# Patient Record
Sex: FEMALE | Race: BLACK OR AFRICAN AMERICAN | NOT HISPANIC OR LATINO | URBAN - METROPOLITAN AREA
[De-identification: names, ages, dates, MRNs, and addresses within clinical notes are randomized per-mention and may not be internally consistent; named-entity substitution may affect disease eponyms.]

---

## 2020-06-26 ENCOUNTER — OFFICE VISIT (OUTPATIENT)
Dept: URBAN - METROPOLITAN AREA TELEHEALTH 2 | Facility: TELEHEALTH | Age: 35
End: 2020-06-26
Payer: COMMERCIAL

## 2020-06-26 ENCOUNTER — LAB OUTSIDE AN ENCOUNTER (OUTPATIENT)
Dept: URBAN - METROPOLITAN AREA TELEHEALTH 2 | Facility: TELEHEALTH | Age: 35
End: 2020-06-26

## 2020-06-26 DIAGNOSIS — R10.13 DYSPEPSIA: ICD-10-CM

## 2020-06-26 DIAGNOSIS — K58.8 OTHER IRRITABLE BOWEL SYNDROME: ICD-10-CM

## 2020-06-26 DIAGNOSIS — K21.9 GERD: ICD-10-CM

## 2020-06-26 PROBLEM — 10743008 IRRITABLE BOWEL SYNDROME: Status: ACTIVE | Noted: 2020-06-26

## 2020-06-26 PROCEDURE — G9903 PT SCRN TBCO ID AS NON USER: HCPCS | Performed by: INTERNAL MEDICINE

## 2020-06-26 PROCEDURE — 99214 OFFICE O/P EST MOD 30 MIN: CPT | Performed by: INTERNAL MEDICINE

## 2020-06-26 PROCEDURE — 1036F TOBACCO NON-USER: CPT | Performed by: INTERNAL MEDICINE

## 2020-06-26 RX ORDER — DOXYCYCLINE 100 MG/1
CAPSULE ORAL
Qty: 0 | Refills: 0 | Status: ON HOLD | COMMUNITY
Start: 1900-01-01

## 2020-06-26 RX ORDER — DICYCLOMINE HYDROCHLORIDE 10 MG/1
TAKE ONE CAPSULE BY MOUTH THREE TIMES DAILY AS NEEDED FOR ABDOMINAL CRAMPING PAIN CAPSULE ORAL
Qty: 90 | Refills: 0
Start: 2020-05-28

## 2020-06-26 RX ORDER — TOPIRAMATE 25 MG/1
TABLET, COATED ORAL
Qty: 0 | Refills: 0 | Status: ACTIVE | COMMUNITY
Start: 1900-01-01

## 2020-06-26 RX ORDER — DICYCLOMINE HYDROCHLORIDE 10 MG/1
TAKE ONE CAPSULE BY MOUTH THREE TIMES DAILY AS NEEDED FOR ABDOMINAL CRAMPING PAIN CAPSULE ORAL
Qty: 90 | Refills: 0 | Status: ACTIVE | COMMUNITY
Start: 2020-05-28

## 2020-06-26 RX ORDER — PANTOPRAZOLE SODIUM 40 MG/1
1 TABLET TABLET, DELAYED RELEASE ORAL ONCE A DAY
Qty: 30 TABLET | Refills: 1 | OUTPATIENT
Start: 2020-06-26

## 2020-06-26 RX ORDER — METHOCARBAMOL 750 MG/1
TABLET, FILM COATED ORAL
Qty: 0 | Refills: 0 | Status: ACTIVE | COMMUNITY
Start: 1900-01-01

## 2020-06-26 NOTE — HPI-TODAY'S VISIT:
Patient is complaining of having reflux that started about few weeks ago.  Patient complains of having severe epigastric burning as well as burning sensation into the throat along with some sore throat dry cough.  Patient feels feels like food is slow to go down however denies any obvious dysphagia.  She reports having some food allergies as and seasonal allergies are started taking Allegra.  Also denies any nausea or vomiting. Patient was seen last in January 2020 for diarrhea.  Stool studies were unremarkable.  She had a c[facility]ac panel done through [facility] that was unremarkable.  She went on low FODMAP diet and lost about 20 pounds her diarrhea has resolved.  She reports only some soft stools but no cramping lower abdominal pain.  Denies any bright red per rectum.

## 2020-07-28 ENCOUNTER — LAB OUTSIDE AN ENCOUNTER (OUTPATIENT)
Dept: URBAN - METROPOLITAN AREA CLINIC 82 | Facility: CLINIC | Age: 35
End: 2020-07-28

## 2020-07-28 ENCOUNTER — TELEPHONE ENCOUNTER (OUTPATIENT)
Dept: URBAN - METROPOLITAN AREA CLINIC 82 | Facility: CLINIC | Age: 35
End: 2020-07-28

## 2020-08-04 ENCOUNTER — OFFICE VISIT (OUTPATIENT)
Dept: URBAN - METROPOLITAN AREA SURGERY CENTER 13 | Facility: SURGERY CENTER | Age: 35
End: 2020-08-04
Payer: COMMERCIAL

## 2020-08-04 DIAGNOSIS — K22.8 COLUMNAR-LINED ESOPHAGUS: ICD-10-CM

## 2020-08-04 DIAGNOSIS — K29.60 ADENOPAPILLOMATOSIS GASTRICA: ICD-10-CM

## 2020-08-04 DIAGNOSIS — R10.13 ABDOMINAL DISCOMFORT, EPIGASTRIC: ICD-10-CM

## 2020-08-04 PROCEDURE — 43239 EGD BIOPSY SINGLE/MULTIPLE: CPT | Performed by: INTERNAL MEDICINE

## 2020-08-04 PROCEDURE — G8907 PT DOC NO EVENTS ON DISCHARG: HCPCS | Performed by: INTERNAL MEDICINE

## 2020-08-04 RX ORDER — METHOCARBAMOL 750 MG/1
TABLET, FILM COATED ORAL
Qty: 0 | Refills: 0 | Status: ACTIVE | COMMUNITY
Start: 1900-01-01

## 2020-08-04 RX ORDER — TOPIRAMATE 25 MG/1
TABLET, COATED ORAL
Qty: 0 | Refills: 0 | Status: ACTIVE | COMMUNITY
Start: 1900-01-01

## 2020-08-04 RX ORDER — DICYCLOMINE HYDROCHLORIDE 10 MG/1
TAKE ONE CAPSULE BY MOUTH THREE TIMES DAILY AS NEEDED FOR ABDOMINAL CRAMPING PAIN CAPSULE ORAL
Qty: 90 | Refills: 0 | Status: ACTIVE | COMMUNITY
Start: 2020-05-28

## 2020-08-04 RX ORDER — DOXYCYCLINE 100 MG/1
CAPSULE ORAL
Qty: 0 | Refills: 0 | Status: ON HOLD | COMMUNITY
Start: 1900-01-01

## 2020-08-04 RX ORDER — PANTOPRAZOLE SODIUM 40 MG/1
1 TABLET TABLET, DELAYED RELEASE ORAL ONCE A DAY
Qty: 30 TABLET | Refills: 1 | Status: ACTIVE | COMMUNITY
Start: 2020-06-26

## 2020-08-09 ENCOUNTER — TELEPHONE ENCOUNTER (OUTPATIENT)
Dept: URBAN - METROPOLITAN AREA CLINIC 92 | Facility: CLINIC | Age: 35
End: 2020-08-09

## 2020-08-19 ENCOUNTER — TELEPHONE ENCOUNTER (OUTPATIENT)
Dept: URBAN - METROPOLITAN AREA CLINIC 82 | Facility: CLINIC | Age: 35
End: 2020-08-19

## 2020-10-18 ENCOUNTER — ERX REFILL RESPONSE (OUTPATIENT)
Dept: URBAN - METROPOLITAN AREA TELEHEALTH 2 | Facility: TELEHEALTH | Age: 35
End: 2020-10-18

## 2020-10-18 RX ORDER — PANTOPRAZOLE SODIUM 40 MG/1
1 TABLET TABLET, DELAYED RELEASE ORAL ONCE A DAY
Qty: 30 | Refills: 0

## 2020-11-15 ENCOUNTER — ERX REFILL RESPONSE (OUTPATIENT)
Dept: URBAN - METROPOLITAN AREA TELEHEALTH 2 | Facility: TELEHEALTH | Age: 35
End: 2020-11-15

## 2020-11-15 RX ORDER — PANTOPRAZOLE SODIUM 40 MG/1
1 TABLET TABLET, DELAYED RELEASE ORAL ONCE A DAY
Qty: 30 | Refills: 0

## 2020-12-15 ENCOUNTER — ERX REFILL RESPONSE (OUTPATIENT)
Dept: URBAN - METROPOLITAN AREA TELEHEALTH 2 | Facility: TELEHEALTH | Age: 35
End: 2020-12-15

## 2020-12-15 RX ORDER — PANTOPRAZOLE SODIUM 40 MG/1
1 TABLET TABLET, DELAYED RELEASE ORAL ONCE A DAY
Qty: 30 | Refills: 0

## 2021-04-21 ENCOUNTER — TELEPHONE ENCOUNTER (OUTPATIENT)
Dept: URBAN - METROPOLITAN AREA CLINIC 82 | Facility: CLINIC | Age: 36
End: 2021-04-21

## 2021-04-26 ENCOUNTER — WEB ENCOUNTER (OUTPATIENT)
Dept: URBAN - METROPOLITAN AREA CLINIC 82 | Facility: CLINIC | Age: 36
End: 2021-04-26

## 2021-05-06 ENCOUNTER — OFFICE VISIT (OUTPATIENT)
Dept: URBAN - METROPOLITAN AREA TELEHEALTH 2 | Facility: TELEHEALTH | Age: 36
End: 2021-05-06
Payer: COMMERCIAL

## 2021-05-06 DIAGNOSIS — K58.9 IRRITABLE BOWEL SYNDROME, UNSPECIFIED TYPE: ICD-10-CM

## 2021-05-06 DIAGNOSIS — K29.50 CHRONIC GASTRITIS WITHOUT BLEEDING, UNSPECIFIED GASTRITIS TYPE: ICD-10-CM

## 2021-05-06 DIAGNOSIS — R10.13 DYSPEPSIA: ICD-10-CM

## 2021-05-06 DIAGNOSIS — K21.9 GERD WITHOUT ESOPHAGITIS: ICD-10-CM

## 2021-05-06 PROCEDURE — 99214 OFFICE O/P EST MOD 30 MIN: CPT | Performed by: INTERNAL MEDICINE

## 2021-05-06 RX ORDER — PANTOPRAZOLE SODIUM 40 MG/1
1 TABLET TABLET, DELAYED RELEASE ORAL ONCE A DAY
Qty: 30 | Refills: 0 | Status: ACTIVE | COMMUNITY

## 2021-05-06 RX ORDER — DICYCLOMINE HYDROCHLORIDE 10 MG/1
TAKE ONE CAPSULE BY MOUTH THREE TIMES DAILY AS NEEDED FOR ABDOMINAL CRAMPING PAIN CAPSULE ORAL
Qty: 90 | Refills: 0 | Status: ACTIVE | COMMUNITY
Start: 2020-05-28

## 2021-05-06 RX ORDER — PANTOPRAZOLE SODIUM 40 MG/1
1 TABLET TABLET, DELAYED RELEASE ORAL ONCE A DAY
Qty: 90 TABLET | Refills: 2

## 2021-05-06 RX ORDER — DOXYCYCLINE 100 MG/1
CAPSULE ORAL
Qty: 0 | Refills: 0 | COMMUNITY
Start: 1900-01-01

## 2021-05-06 RX ORDER — DICYCLOMINE HYDROCHLORIDE 10 MG/1
TAKE ONE CAPSULE BY MOUTH THREE TIMES DAILY AS NEEDED FOR ABDOMINAL CRAMPING PAIN CAPSULE ORAL
Qty: 180 TABLET | Refills: 3
Start: 2020-05-28 | End: 2022-05-01

## 2021-05-06 RX ORDER — TOPIRAMATE 25 MG/1
TABLET, COATED ORAL
Qty: 0 | Refills: 0 | Status: ACTIVE | COMMUNITY
Start: 1900-01-01

## 2021-05-06 RX ORDER — METHOCARBAMOL 750 MG/1
TABLET, FILM COATED ORAL
Qty: 0 | Refills: 0 | Status: ACTIVE | COMMUNITY
Start: 1900-01-01

## 2021-05-06 RX ORDER — SUCRALFATE 1 G
1 TABLET ON AN EMPTY STOMACH TABLET ORAL TWICE A DAY
Qty: 60 TABLET | Refills: 4 | OUTPATIENT
Start: 2021-05-06 | End: 2021-10-03

## 2021-05-06 NOTE — HPI-TODAY'S VISIT:
Patient is complaining of having reflux that started about few weeks ago.  Patient complains of having severe epigastric burning as well as burning sensation into the throat along with some sore throat dry cough.  Patient feels feels like food is slow to go down however denies any obvious dysphagia.  She reports having some food allergies as and seasonal allergies are started taking Allegra.  Also denies any nausea or vomiting. Patient was seen last in January 2020 for diarrhea.  Stool studies were unremarkable.  She had a celiac panel done through [facility] that was unremarkable.  She went on low FODMAP diet and lost about 20 pounds her diarrhea has resolved.  She reports only some soft stools but no cramping lower abdominal pain.  Denies any bright red per rectum.  5/6/21:  Ms. Beltran was seen today for the follow-up.  She was last seen she had an upper endoscopy that revealed gastritis otherwise unremarkable.  She was complaining of having sleep disorder and insomnia for which she underwent sleep study was told to have negative for sleep apnea.  She was given Carafate to take it for dyspepsia.  Patient currently on pantoprazole once a day she still reports having epigastric fullness and distention after she eats food.  Patient stated eating food makes her symptoms better however also her symptoms get worse at random.  Patient denies any unintentional weight loss.  Bowel movements are regular.  Other complaints includes abdominal bloating and cramping for which dicyclomine helps.

## 2021-11-02 ENCOUNTER — OFFICE VISIT (OUTPATIENT)
Dept: URBAN - METROPOLITAN AREA TELEHEALTH 2 | Facility: TELEHEALTH | Age: 36
End: 2021-11-02
Payer: COMMERCIAL

## 2021-11-02 DIAGNOSIS — K21.9 GERD WITHOUT ESOPHAGITIS: ICD-10-CM

## 2021-11-02 DIAGNOSIS — K58.9 IRRITABLE BOWEL SYNDROME, UNSPECIFIED TYPE: ICD-10-CM

## 2021-11-02 DIAGNOSIS — K29.50 CHRONIC GASTRITIS WITHOUT BLEEDING, UNSPECIFIED GASTRITIS TYPE: ICD-10-CM

## 2021-11-02 DIAGNOSIS — R10.13 DYSPEPSIA: ICD-10-CM

## 2021-11-02 PROCEDURE — 99214 OFFICE O/P EST MOD 30 MIN: CPT | Performed by: INTERNAL MEDICINE

## 2021-11-02 RX ORDER — SUCRALFATE 1 G
1 TABLET ON AN EMPTY STOMACH TABLET ORAL TWICE A DAY
Qty: 60 TABLET | Refills: 1 | OUTPATIENT
Start: 2021-11-02 | End: 2021-12-31

## 2021-11-02 RX ORDER — DICYCLOMINE HYDROCHLORIDE 10 MG/1
TAKE ONE CAPSULE BY MOUTH THREE TIMES DAILY AS NEEDED FOR ABDOMINAL CRAMPING PAIN CAPSULE ORAL
Qty: 180 TABLET | Refills: 3

## 2021-11-02 RX ORDER — DOXYCYCLINE 100 MG/1
CAPSULE ORAL
Qty: 0 | Refills: 0 | COMMUNITY
Start: 1900-01-01

## 2021-11-02 RX ORDER — ESOMEPRAZOLE MAGNESIUM 40 MG/1
1 CAPSULE CAPSULE, DELAYED RELEASE ORAL
Qty: 60 CAPSULE | Refills: 3 | OUTPATIENT
Start: 2021-11-02

## 2021-11-02 RX ORDER — TOPIRAMATE 25 MG/1
TABLET, COATED ORAL
Qty: 0 | Refills: 0 | Status: ON HOLD | COMMUNITY
Start: 1900-01-01

## 2021-11-02 RX ORDER — PANTOPRAZOLE SODIUM 40 MG/1
1 TABLET TABLET, DELAYED RELEASE ORAL ONCE A DAY
Qty: 90 TABLET | Refills: 2 | Status: ACTIVE | COMMUNITY

## 2021-11-02 RX ORDER — METHOCARBAMOL 750 MG/1
TABLET, FILM COATED ORAL
Qty: 0 | Refills: 0 | Status: ON HOLD | COMMUNITY
Start: 1900-01-01

## 2021-11-02 RX ORDER — DICYCLOMINE HYDROCHLORIDE 10 MG/1
TAKE ONE CAPSULE BY MOUTH THREE TIMES DAILY AS NEEDED FOR ABDOMINAL CRAMPING PAIN CAPSULE ORAL
Qty: 180 TABLET | Refills: 3 | Status: ACTIVE | COMMUNITY
Start: 2020-05-28 | End: 2022-05-01

## 2021-11-02 NOTE — HPI-TODAY'S VISIT:
Patient is complaining of having reflux that started about few weeks ago.  Patient complains of having severe epigastric burning as well as burning sensation into the throat along with some sore throat dry cough.  Patient feels feels like food is slow to go down however denies any obvious dysphagia.  She reports having some food allergies as and seasonal allergies are started taking Allegra.  Also denies any nausea or vomiting. Patient was seen last in January 2020 for diarrhea.  Stool studies were unremarkable.  She had a celiac panel done through [facility] that was unremarkable.  She went on low FODMAP diet and lost about 20 pounds her diarrhea has resolved.  She reports only some soft stools but no cramping lower abdominal pain.  Denies any bright red per rectum.  5/6/21:  Ms. Beltran was seen today for the follow-up.  She was last seen she had an upper endoscopy that revealed gastritis otherwise unremarkable.  She was complaining of having sleep disorder and insomnia for which she underwent sleep study was told to have negative for sleep apnea.  She was given Carafate to take it for dyspepsia.  Patient currently on pantoprazole once a day she still reports having epigastric fullness and distention after she eats food.  Patient stated eating food makes her symptoms better however also her symptoms get worse at random.  Patient denies any unintentional weight loss.  Bowel movements are regular.  Other complaints includes abdominal bloating and cramping for which dicyclomine helps.  11/2/21 Ms. Beltran is a 36-year-old female seen today for follow-up.  Patient stated she was diagnosed with asthma since July.  She is seeing pulmonologist.  She is also recommended to see a psychiatrist and psychologist because of her significant anxiety.  Reports having emergency room visits for having chest pressure sensation palpitations.  Patient reports taking pantoprazole once a day and she still feels like fullness as well as burning sensation into her throat.  Patient also reports lower abdominal cramping and discomfort.  Patient reports symptoms gets worse when she is stressed and anxious.  Other complaints includes epigastric fullness and less appetite and she after she eats she reports she has to sit up for 3 to 4 hours to feel better and can lie down.  Currently her anxiety is not under control and she is not on any medications.  Denies any melena bright red per rectum.  Prior procedure results have been reviewed with the patient.

## 2021-11-08 ENCOUNTER — TELEPHONE ENCOUNTER (OUTPATIENT)
Dept: URBAN - METROPOLITAN AREA CLINIC 78 | Facility: CLINIC | Age: 36
End: 2021-11-08

## 2022-03-18 ENCOUNTER — OFFICE VISIT (OUTPATIENT)
Dept: URBAN - METROPOLITAN AREA CLINIC 82 | Facility: CLINIC | Age: 37
End: 2022-03-18
Payer: COMMERCIAL

## 2022-03-18 ENCOUNTER — WEB ENCOUNTER (OUTPATIENT)
Dept: URBAN - METROPOLITAN AREA CLINIC 82 | Facility: CLINIC | Age: 37
End: 2022-03-18

## 2022-03-18 ENCOUNTER — LAB OUTSIDE AN ENCOUNTER (OUTPATIENT)
Dept: URBAN - METROPOLITAN AREA CLINIC 82 | Facility: CLINIC | Age: 37
End: 2022-03-18

## 2022-03-18 VITALS
HEART RATE: 89 BPM | HEIGHT: 64 IN | BODY MASS INDEX: 28.85 KG/M2 | DIASTOLIC BLOOD PRESSURE: 76 MMHG | TEMPERATURE: 97.1 F | WEIGHT: 169 LBS | SYSTOLIC BLOOD PRESSURE: 117 MMHG

## 2022-03-18 DIAGNOSIS — K29.50 CHRONIC GASTRITIS WITHOUT BLEEDING, UNSPECIFIED GASTRITIS TYPE: ICD-10-CM

## 2022-03-18 DIAGNOSIS — K21.9 GERD WITHOUT ESOPHAGITIS: ICD-10-CM

## 2022-03-18 DIAGNOSIS — K58.8 OTHER IRRITABLE BOWEL SYNDROME: ICD-10-CM

## 2022-03-18 DIAGNOSIS — R10.13 EPIGASTRIC PAIN: ICD-10-CM

## 2022-03-18 DIAGNOSIS — R63.4 WEIGHT LOSS: ICD-10-CM

## 2022-03-18 PROBLEM — 8493009: Status: ACTIVE | Noted: 2021-05-06

## 2022-03-18 PROBLEM — 10743008: Status: ACTIVE | Noted: 2021-05-06

## 2022-03-18 PROBLEM — 79922009: Status: ACTIVE | Noted: 2022-03-18

## 2022-03-18 PROBLEM — 816160009 WEIGHT LOSS: Status: ACTIVE | Noted: 2022-03-18

## 2022-03-18 PROCEDURE — 99214 OFFICE O/P EST MOD 30 MIN: CPT | Performed by: INTERNAL MEDICINE

## 2022-03-18 RX ORDER — ESOMEPRAZOLE MAGNESIUM 40 MG/1
1 CAPSULE CAPSULE, DELAYED RELEASE ORAL
Qty: 60 CAPSULE | Refills: 3 | Status: ACTIVE | COMMUNITY
Start: 2021-11-02

## 2022-03-18 RX ORDER — DOXYCYCLINE 100 MG/1
CAPSULE ORAL
Qty: 0 | Refills: 0 | COMMUNITY
Start: 1900-01-01

## 2022-03-18 RX ORDER — DICYCLOMINE HYDROCHLORIDE 10 MG/1
TAKE ONE CAPSULE BY MOUTH THREE TIMES DAILY AS NEEDED FOR ABDOMINAL CRAMPING PAIN CAPSULE ORAL
Qty: 180 TABLET | Refills: 3

## 2022-03-18 RX ORDER — TOPIRAMATE 25 MG/1
TABLET, COATED ORAL
Qty: 0 | Refills: 0 | Status: ON HOLD | COMMUNITY
Start: 1900-01-01

## 2022-03-18 RX ORDER — DICYCLOMINE HYDROCHLORIDE 10 MG/1
TAKE ONE CAPSULE BY MOUTH THREE TIMES DAILY AS NEEDED FOR ABDOMINAL CRAMPING PAIN CAPSULE ORAL
Qty: 180 TABLET | Refills: 3 | Status: ACTIVE | COMMUNITY

## 2022-03-18 RX ORDER — SUCRALFATE 1 G
1 TABLET ON AN EMPTY STOMACH TABLET ORAL TWICE A DAY
Qty: 60 TABLET | Refills: 4 | OUTPATIENT

## 2022-03-18 RX ORDER — PANTOPRAZOLE SODIUM 40 MG/1
1 TABLET TABLET, DELAYED RELEASE ORAL ONCE A DAY
Qty: 90 TABLET | Refills: 1 | OUTPATIENT
Start: 2022-03-18

## 2022-03-18 RX ORDER — METHOCARBAMOL 750 MG/1
TABLET, FILM COATED ORAL
Qty: 0 | Refills: 0 | Status: ON HOLD | COMMUNITY
Start: 1900-01-01

## 2022-03-18 RX ORDER — PANTOPRAZOLE SODIUM 40 MG/1
1 TABLET TABLET, DELAYED RELEASE ORAL ONCE A DAY
Qty: 90 TABLET | Refills: 2 | Status: ACTIVE | COMMUNITY

## 2022-03-18 NOTE — HPI-TODAY'S VISIT:
Patient is complaining of having reflux that started about few weeks ago.  Patient complains of having severe epigastric burning as well as burning sensation into the throat along with some sore throat dry cough.  Patient feels feels like food is slow to go down however denies any obvious dysphagia.  She reports having some food allergies as and seasonal allergies are started taking Allegra.  Also denies any nausea or vomiting. Patient was seen last in January 2020 for diarrhea.  Stool studies were unremarkable.  She had a celiac panel done [facility] that was unremarkable.  She went on low FODMAP diet and lost about 20 pounds her diarrhea has resolved.  She reports only some soft stools but no cramping lower abdominal pain.  Denies any bright red per rectum.  5/6/21:  Ms. Beltran was seen today for the follow-up.  She was last seen she had an upper endoscopy that revealed gastritis otherwise unremarkable.  She was complaining of having sleep disorder and insomnia for which she underwent sleep study was told to have negative for sleep apnea.  She was given Carafate to take it for dyspepsia.  Patient currently on pantoprazole once a day she still reports having epigastric fullness and distention after she eats food.  Patient stated eating food makes her symptoms better however also her symptoms get worse at random.  Patient denies any unintentional weight loss.  Bowel movements are regular.  Other complaints includes abdominal bloating and cramping for which dicyclomine helps.  11/2/21 Ms. Beltran is a 36-year-old female seen today for follow-up.  Patient stated she was diagnosed with asthma since July.  She is seeing pulmonologist.  She is also recommended to see a psychiatrist and psychologist because of her significant anxiety.  Reports having emergency room visits for having chest pressure sensation palpitations.  Patient reports taking pantoprazole once a day and she still feels like fullness as well as burning sensation into her throat.  Patient also reports lower abdominal cramping and discomfort.  Patient reports symptoms gets worse when she is stressed and anxious.  Other complaints includes epigastric fullness and less appetite and she after she eats she reports she has to sit up for 3 to 4 hours to feel better and can lie down.  Currently her anxiety is not under control and she is not on any medications.  Denies any melena bright red per rectum.  Prior procedure results have been reviewed with the patient.  3/18/22 Patient's was seen today for the follow-up.  Since she was last seen she reports having 5 pound unintentional weight loss.  Also complains of having feeling hungry and having indigestion after eating food.  She also reports having concern for bloating and epigastric fullness.  Is been taking PPI in the morning.  Admits for having increased stress and anxiety.  She reports epigastric abdominal discomfort not under control and it is burning in sensation nonradiating.  Slightly better with acid reducing medication however symptoms are not well controlled.  Admits to having esophageal spasm cough and chest tightness.  Patient was told to have a panic disorder.  Other complaints also includes abdominal bloating and discomfort denies any diarrhea or constipation.

## 2022-03-19 LAB
A/G RATIO: 1.3
ALBUMIN: 4.4
ALKALINE PHOSPHATASE: 76
ALT (SGPT): 10
AST (SGOT): 15
BASO (ABSOLUTE): 0
BASOS: 0
BILIRUBIN, TOTAL: <0.2
BUN/CREATININE RATIO: 7
BUN: 6
CALCIUM: 9.6
CARBON DIOXIDE, TOTAL: 21
CHLORIDE: 102
CREATININE: 0.87
EGFR: 90
EOS (ABSOLUTE): 0.1
EOS: 1
GLOBULIN, TOTAL: 3.5
GLUCOSE: 87
HEMATOCRIT: 31.6
HEMATOLOGY COMMENTS:: (no result)
HEMOGLOBIN: 9.7
IMMATURE CELLS: (no result)
IMMATURE GRANS (ABS): 0
IMMATURE GRANULOCYTES: 0
LYMPHS (ABSOLUTE): 1.3
LYMPHS: 29
MCH: 24.6
MCHC: 30.7
MCV: 80
MONOCYTES(ABSOLUTE): 0.4
MONOCYTES: 10
NEUTROPHILS (ABSOLUTE): 2.7
NEUTROPHILS: 60
NRBC: (no result)
PLATELETS: 416
POTASSIUM: 3.9
PROTEIN, TOTAL: 7.9
RBC: 3.95
RDW: 18.5
SODIUM: 137
WBC: 4.5

## 2022-03-24 ENCOUNTER — OFFICE VISIT (OUTPATIENT)
Dept: URBAN - METROPOLITAN AREA SURGERY CENTER 13 | Facility: SURGERY CENTER | Age: 37
End: 2022-03-24

## 2022-03-28 ENCOUNTER — TELEPHONE ENCOUNTER (OUTPATIENT)
Dept: URBAN - METROPOLITAN AREA CLINIC 82 | Facility: CLINIC | Age: 37
End: 2022-03-28

## 2022-03-31 ENCOUNTER — LAB OUTSIDE AN ENCOUNTER (OUTPATIENT)
Dept: URBAN - METROPOLITAN AREA CLINIC 115 | Facility: CLINIC | Age: 37
End: 2022-03-31

## 2022-03-31 ENCOUNTER — TELEPHONE ENCOUNTER (OUTPATIENT)
Dept: URBAN - METROPOLITAN AREA CLINIC 115 | Facility: CLINIC | Age: 37
End: 2022-03-31

## 2022-04-29 ENCOUNTER — OFFICE VISIT (OUTPATIENT)
Dept: URBAN - METROPOLITAN AREA CLINIC 81 | Facility: CLINIC | Age: 37
End: 2022-04-29
Payer: COMMERCIAL

## 2022-04-29 DIAGNOSIS — R10.13 ABDOMINAL PAIN, EPIGASTRIC: ICD-10-CM

## 2022-04-29 PROCEDURE — 76700 US EXAM ABDOM COMPLETE: CPT | Performed by: INTERNAL MEDICINE

## 2022-05-26 PROBLEM — 162031009 DYSPEPSIA: Status: ACTIVE | Noted: 2020-06-26

## 2022-05-26 PROBLEM — 266435005: Status: ACTIVE | Noted: 2021-05-06

## 2022-06-09 ENCOUNTER — TELEPHONE ENCOUNTER (OUTPATIENT)
Dept: URBAN - METROPOLITAN AREA CLINIC 92 | Facility: CLINIC | Age: 37
End: 2022-06-09

## 2022-06-09 ENCOUNTER — CLAIMS CREATED FROM THE CLAIM WINDOW (OUTPATIENT)
Dept: URBAN - METROPOLITAN AREA CLINIC 4 | Facility: CLINIC | Age: 37
End: 2022-06-09
Payer: COMMERCIAL

## 2022-06-09 ENCOUNTER — OFFICE VISIT (OUTPATIENT)
Dept: URBAN - METROPOLITAN AREA SURGERY CENTER 13 | Facility: SURGERY CENTER | Age: 37
End: 2022-06-09
Payer: COMMERCIAL

## 2022-06-09 DIAGNOSIS — R10.13 ABDOMINAL DISCOMFORT, EPIGASTRIC: ICD-10-CM

## 2022-06-09 DIAGNOSIS — K29.70 GASTRITIS, UNSPECIFIED, WITHOUT BLEEDING: ICD-10-CM

## 2022-06-09 DIAGNOSIS — K21.9 ACID REFLUX: ICD-10-CM

## 2022-06-09 DIAGNOSIS — K21.9 GASTRO-ESOPHAGEAL REFLUX DISEASE WITHOUT ESOPHAGITIS: ICD-10-CM

## 2022-06-09 DIAGNOSIS — K31.89 ACQUIRED DEFORMITY OF DUODENUM: ICD-10-CM

## 2022-06-09 PROCEDURE — G8907 PT DOC NO EVENTS ON DISCHARG: HCPCS | Performed by: INTERNAL MEDICINE

## 2022-06-09 PROCEDURE — 43239 EGD BIOPSY SINGLE/MULTIPLE: CPT | Performed by: INTERNAL MEDICINE

## 2022-06-09 PROCEDURE — 88312 SPECIAL STAINS GROUP 1: CPT | Performed by: PATHOLOGY

## 2022-06-09 PROCEDURE — 88305 TISSUE EXAM BY PATHOLOGIST: CPT | Performed by: PATHOLOGY

## 2022-06-09 RX ORDER — DICYCLOMINE HYDROCHLORIDE 10 MG/1
TAKE ONE CAPSULE BY MOUTH THREE TIMES DAILY AS NEEDED FOR ABDOMINAL CRAMPING PAIN CAPSULE ORAL
Qty: 180 TABLET | Refills: 3 | Status: ACTIVE | COMMUNITY

## 2022-06-09 RX ORDER — PANTOPRAZOLE SODIUM 40 MG/1
1 TABLET TABLET, DELAYED RELEASE ORAL ONCE A DAY
Qty: 90 TABLET | Refills: 2

## 2022-06-09 RX ORDER — PANTOPRAZOLE SODIUM 40 MG/1
1 TABLET TABLET, DELAYED RELEASE ORAL ONCE A DAY
Qty: 90 TABLET | Refills: 1 | Status: ACTIVE | COMMUNITY
Start: 2022-03-18

## 2022-06-09 RX ORDER — SUCRALFATE 1 G
1 TABLET ON AN EMPTY STOMACH TABLET ORAL TWICE A DAY
Qty: 60 TABLET | Refills: 4 | Status: ACTIVE | COMMUNITY

## 2022-06-09 RX ORDER — DOXYCYCLINE 100 MG/1
CAPSULE ORAL
Qty: 0 | Refills: 0 | COMMUNITY
Start: 1900-01-01

## 2022-06-09 RX ORDER — ESOMEPRAZOLE MAGNESIUM 40 MG/1
1 CAPSULE CAPSULE, DELAYED RELEASE ORAL
Qty: 60 CAPSULE | Refills: 3 | Status: ACTIVE | COMMUNITY
Start: 2021-11-02

## 2022-06-09 RX ORDER — TOPIRAMATE 25 MG/1
TABLET, COATED ORAL
Qty: 0 | Refills: 0 | Status: ON HOLD | COMMUNITY
Start: 1900-01-01

## 2022-06-09 RX ORDER — FLUCONAZOLE 100 MG/1
2 TABS DAY ONE, THEN 1 TAB FOR 29 DAYS TABLET ORAL ONCE A DAY
Qty: 15 TABLET | Refills: 0 | OUTPATIENT
Start: 2022-06-09 | End: 2022-06-23

## 2022-06-09 RX ORDER — PANTOPRAZOLE SODIUM 40 MG/1
1 TABLET TABLET, DELAYED RELEASE ORAL ONCE A DAY
Qty: 90 TABLET | Refills: 2 | Status: ACTIVE | COMMUNITY

## 2022-06-09 RX ORDER — METHOCARBAMOL 750 MG/1
TABLET, FILM COATED ORAL
Qty: 0 | Refills: 0 | Status: ON HOLD | COMMUNITY
Start: 1900-01-01

## 2022-06-10 ENCOUNTER — TELEPHONE ENCOUNTER (OUTPATIENT)
Dept: URBAN - METROPOLITAN AREA CLINIC 115 | Facility: CLINIC | Age: 37
End: 2022-06-10

## 2022-06-10 RX ORDER — PANTOPRAZOLE SODIUM 40 MG/1
1 TABLET TABLET, DELAYED RELEASE ORAL ONCE A DAY
Qty: 90 TABLET | Refills: 2

## 2022-06-10 RX ORDER — FLUCONAZOLE 100 MG/1
2 TABS DAY ONE, THEN 1 TAB FOR 29 DAYS TABLET ORAL ONCE A DAY
Qty: 15 TABLET | Refills: 0
Start: 2022-06-09 | End: 2022-06-24

## 2024-01-25 ENCOUNTER — OFFICE VISIT (OUTPATIENT)
Dept: URBAN - METROPOLITAN AREA CLINIC 82 | Facility: CLINIC | Age: 39
End: 2024-01-25
Payer: COMMERCIAL

## 2024-01-25 ENCOUNTER — DASHBOARD ENCOUNTERS (OUTPATIENT)
Age: 39
End: 2024-01-25

## 2024-01-25 ENCOUNTER — LAB OUTSIDE AN ENCOUNTER (OUTPATIENT)
Dept: URBAN - METROPOLITAN AREA CLINIC 82 | Facility: CLINIC | Age: 39
End: 2024-01-25

## 2024-01-25 VITALS
HEART RATE: 91 BPM | DIASTOLIC BLOOD PRESSURE: 72 MMHG | HEIGHT: 64 IN | BODY MASS INDEX: 30.39 KG/M2 | TEMPERATURE: 97.3 F | WEIGHT: 178 LBS | SYSTOLIC BLOOD PRESSURE: 110 MMHG

## 2024-01-25 DIAGNOSIS — R10.31 RLQ ABDOMINAL PAIN: ICD-10-CM

## 2024-01-25 DIAGNOSIS — R10.13 EPIGASTRIC PAIN: ICD-10-CM

## 2024-01-25 DIAGNOSIS — R10.13 DYSPEPSIA: ICD-10-CM

## 2024-01-25 DIAGNOSIS — K58.8 OTHER IRRITABLE BOWEL SYNDROME: ICD-10-CM

## 2024-01-25 PROBLEM — 724556004: Status: ACTIVE | Noted: 2024-01-25

## 2024-01-25 PROCEDURE — 99214 OFFICE O/P EST MOD 30 MIN: CPT | Performed by: INTERNAL MEDICINE

## 2024-01-25 RX ORDER — SUCRALFATE 1 G
1 TABLET ON AN EMPTY STOMACH TABLET ORAL TWICE A DAY
Qty: 60 TABLET | Refills: 4 | Status: ACTIVE | COMMUNITY

## 2024-01-25 RX ORDER — PANTOPRAZOLE SODIUM 40 MG/1
1 TABLET TABLET, DELAYED RELEASE ORAL ONCE A DAY
Qty: 90 TABLET | Refills: 2 | Status: ACTIVE | COMMUNITY

## 2024-01-25 RX ORDER — DICYCLOMINE HYDROCHLORIDE 10 MG/1
TAKE ONE CAPSULE BY MOUTH THREE TIMES DAILY AS NEEDED FOR ABDOMINAL CRAMPING PAIN CAPSULE ORAL
Qty: 180 TABLET | Refills: 3 | Status: ACTIVE | COMMUNITY

## 2024-01-25 RX ORDER — DICYCLOMINE HYDROCHLORIDE 10 MG/1
1 CAPSULE CAPSULE ORAL
Qty: 90 CAPSULE | Refills: 1 | OUTPATIENT
Start: 2024-01-25 | End: 2024-03-25

## 2024-01-25 RX ORDER — METHOCARBAMOL 750 MG/1
TABLET, FILM COATED ORAL
Qty: 0 | Refills: 0 | Status: DISCONTINUED | COMMUNITY
Start: 1900-01-01

## 2024-01-25 RX ORDER — ESOMEPRAZOLE MAGNESIUM 40 MG/1
1 CAPSULE CAPSULE, DELAYED RELEASE ORAL
Qty: 60 CAPSULE | Refills: 3 | Status: DISCONTINUED | COMMUNITY
Start: 2021-11-02

## 2024-01-25 RX ORDER — PANTOPRAZOLE SODIUM 40 MG/1
1 TABLET TABLET, DELAYED RELEASE ORAL ONCE A DAY
Qty: 90 TABLET | Refills: 2

## 2024-01-25 RX ORDER — POLYETHYLENE GLYCOL 3350 17 G/DOSE
AS DIRECTED PRIOR TO COLONSOCOPY POWDER (GRAM) ORAL ONCE A DAY
Qty: 238  GRAM | Refills: 0 | OUTPATIENT
Start: 2024-01-25 | End: 2024-01-26

## 2024-01-25 RX ORDER — PANTOPRAZOLE SODIUM 40 MG/1
1 TABLET TABLET, DELAYED RELEASE ORAL ONCE A DAY
Qty: 90 TABLET | Refills: 1 | Status: ACTIVE | COMMUNITY
Start: 2022-03-18

## 2024-01-25 RX ORDER — DOXYCYCLINE 100 MG/1
CAPSULE ORAL
Qty: 0 | Refills: 0 | Status: DISCONTINUED | COMMUNITY
Start: 1900-01-01

## 2024-01-25 RX ORDER — TOPIRAMATE 25 MG/1
TABLET, COATED ORAL
Qty: 0 | Refills: 0 | Status: DISCONTINUED | COMMUNITY
Start: 1900-01-01

## 2024-01-25 RX ORDER — FLUTICASONE FUROATE 200 UG/1
1 PUFF POWDER RESPIRATORY (INHALATION) ONCE A DAY
Status: ACTIVE | COMMUNITY

## 2024-01-25 NOTE — HPI-TODAY'S VISIT:
Patient is complaining of having reflux that started about few weeks ago.  Patient complains of having severe epigastric burning as well as burning sensation into the throat along with some sore throat dry cough.  Patient feels feels like food is slow to go down however denies any obvious dysphagia.  She reports having some food allergies as and seasonal allergies are started taking Allegra.  Also denies any nausea or vomiting. Patient was seen last in January 2020 for diarrhea.  Stool studies were unremarkable.  She had a [facility]ac [facility] that was unremarkable.  She went on low FODMAP diet and lost about 20 pounds her diarrhea has resolved.  She reports only some soft stools but no cramping lower abdominal pain.  Denies any bright red per rectum.  5/6/21:  Ms. Beltran was seen today for the follow-up.  She was last seen she had an upper endoscopy that revealed gastritis otherwise unremarkable.  She was complaining of having sleep disorder and insomnia for which she underwent sleep study was told to have negative for sleep apnea.  She was given Carafate to take it for dyspepsia.  Patient currently on pantoprazole once a day she still reports having epigastric fullness and distention after she eats food.  Patient stated eating food makes her symptoms better however also her symptoms get worse at random.  Patient denies any unintentional weight loss.  Bowel movements are regular.  Other complaints includes abdominal bloating and cramping for which dicyclomine helps.  11/2/21 Ms. Beltran is a 36-year-old female seen today for follow-up.  Patient stated she was diagnosed with asthma since July.  She is seeing pulmonologist.  She is also recommended to see a psychiatrist and psychologist because of her significant anxiety.  Reports having emergency room visits for having chest pressure sensation palpitations.  Patient reports taking pantoprazole once a day and she still feels like fullness as well as burning sensation into her throat.  Patient also reports lower abdominal cramping and discomfort.  Patient reports symptoms gets worse when she is stressed and anxious.  Other complaints includes epigastric fullness and less appetite and she after she eats she reports she has to sit up for 3 to 4 hours to feel better and can lie down.  Currently her anxiety is not under control and she is not on any medications.  Denies any melena bright red per rectum.  Prior procedure results have been reviewed with the patient.  3/18/22 Patient's was seen today for the follow-up.  Since she was last seen she reports having 5 pound unintentional weight loss.  Also complains of having feeling hungry and having indigestion after eating food.  She also reports having concern for bloating and epigastric fullness.  Is been taking PPI in the morning.  Admits for having increased stress and anxiety.  She reports epigastric abdominal discomfort not under control and it is burning in sensation nonradiating.  Slightly better with acid reducing medication however symptoms are not well controlled.  Admits to having esophageal spasm cough and chest tightness.  Patient was told to have a panic disorder.  Other complaints also includes abdominal bloating and discomfort denies any diarrhea or constipation.  1/25/24: Patient was seen today for complaints of lower abdominal pain and bloating and change in bowel habits.  She was last seen in March 2022.  She complains of right lower quadrant abdominal pain which is intermittent not sure if it was related to the ovulation with midcycle pain.  Patient reports having a complains of having some nausea associated with it.  Patient also reports having abdominal distention and fullness during this time and causes loss of appetite.  Patient other complaints also includes weight gain significant belching and burping as well as nausea associated with it not on any birth control pills she reports having fibroids and she had a recent ablation done she denies any heavy has cycles.  Patient is planning on getting pregnant.  She admits to taking ibuprofen on a frequent basis for recent bleeding.  And also lower abdominal pain and cramping.  Also reports having change in the consistency of the stools with the hard and soft stools alternating.  She has had an upper endoscopy in the past in 2020 that showed erosive esophagitis and hiatal hernia.  She had an unremarkable ultrasound of the abdomen in the past.  Patient denies any right upper quadrant pain.  Patient denies any family history of inflammatory bowel disease.

## 2024-01-27 LAB
IMMUNOGLOBULIN A: 501
INTERPRETATION: (no result)
TISSUE TRANSGLUTAMINASE AB, IGA: <1

## 2024-03-08 ENCOUNTER — LAB (OUTPATIENT)
Dept: URBAN - METROPOLITAN AREA CLINIC 4 | Facility: CLINIC | Age: 39
End: 2024-03-08
Payer: COMMERCIAL

## 2024-03-08 ENCOUNTER — COL/EGD (OUTPATIENT)
Dept: URBAN - METROPOLITAN AREA SURGERY CENTER 13 | Facility: SURGERY CENTER | Age: 39
End: 2024-03-08
Payer: COMMERCIAL

## 2024-03-08 DIAGNOSIS — K29.70 GASTRITIS, UNSPECIFIED, WITHOUT BLEEDING: ICD-10-CM

## 2024-03-08 DIAGNOSIS — K21.00 GERD WITH ESOPHAGITIS WITHOUT BLEEDING: ICD-10-CM

## 2024-03-08 DIAGNOSIS — R12 HEARTBURN: ICD-10-CM

## 2024-03-08 DIAGNOSIS — K21.9 GASTRO-ESOPHAGEAL REFLUX DISEASE WITHOUT ESOPHAGITIS: ICD-10-CM

## 2024-03-08 DIAGNOSIS — K29.50 CHRONIC GASTRITIS WITHOUT BLEEDING: ICD-10-CM

## 2024-03-08 DIAGNOSIS — D50.9 IRON DEFICIENCY ANEMIA: ICD-10-CM

## 2024-03-08 PROCEDURE — 88342 IMHCHEM/IMCYTCHM 1ST ANTB: CPT | Performed by: PATHOLOGY

## 2024-03-08 PROCEDURE — 45378 DIAGNOSTIC COLONOSCOPY: CPT | Performed by: INTERNAL MEDICINE

## 2024-03-08 PROCEDURE — 43239 EGD BIOPSY SINGLE/MULTIPLE: CPT | Performed by: INTERNAL MEDICINE

## 2024-03-08 PROCEDURE — 88312 SPECIAL STAINS GROUP 1: CPT | Performed by: PATHOLOGY

## 2024-03-08 PROCEDURE — 88305 TISSUE EXAM BY PATHOLOGIST: CPT | Performed by: PATHOLOGY

## 2024-03-08 RX ORDER — PANTOPRAZOLE SODIUM 40 MG/1
1 TABLET TABLET, DELAYED RELEASE ORAL ONCE A DAY
Qty: 90 TABLET | Refills: 1 | Status: ACTIVE | COMMUNITY
Start: 2022-03-18

## 2024-03-08 RX ORDER — FLUTICASONE FUROATE 200 UG/1
1 PUFF POWDER RESPIRATORY (INHALATION) ONCE A DAY
Status: ACTIVE | COMMUNITY

## 2024-03-08 RX ORDER — SUCRALFATE 1 G/1
1 TABLET ON AN EMPTY STOMACH TABLET ORAL TWICE A DAY
Qty: 60 TABLET | Refills: 4 | Status: ACTIVE | COMMUNITY

## 2024-03-08 RX ORDER — PANTOPRAZOLE SODIUM 40 MG/1
1 TABLET TABLET, DELAYED RELEASE ORAL ONCE A DAY
Qty: 90 TABLET | Refills: 2 | Status: ACTIVE | COMMUNITY

## 2024-03-08 RX ORDER — DICYCLOMINE HYDROCHLORIDE 10 MG/1
1 CAPSULE CAPSULE ORAL
Qty: 90 CAPSULE | Refills: 1 | Status: ACTIVE | COMMUNITY
Start: 2024-01-25 | End: 2024-03-25

## 2024-03-08 RX ORDER — DICYCLOMINE HYDROCHLORIDE 10 MG/1
TAKE ONE CAPSULE BY MOUTH THREE TIMES DAILY AS NEEDED FOR ABDOMINAL CRAMPING PAIN CAPSULE ORAL
Qty: 180 TABLET | Refills: 3 | Status: ACTIVE | COMMUNITY

## 2024-07-14 ENCOUNTER — ERX REFILL RESPONSE (OUTPATIENT)
Dept: URBAN - METROPOLITAN AREA CLINIC 82 | Facility: CLINIC | Age: 39
End: 2024-07-14

## 2024-07-14 RX ORDER — PANTOPRAZOLE SODIUM 40 MG/1
TAKE 1 TABLET BY MOUTH DAILY TABLET, DELAYED RELEASE ORAL
Qty: 90 TABLET | Refills: 0 | OUTPATIENT

## 2024-07-14 RX ORDER — PANTOPRAZOLE SODIUM 40 MG/1
1 TABLET TABLET, DELAYED RELEASE ORAL ONCE A DAY
Qty: 90 TABLET | Refills: 0 | OUTPATIENT

## 2024-07-31 ENCOUNTER — OFFICE VISIT (OUTPATIENT)
Dept: URBAN - METROPOLITAN AREA CLINIC 115 | Facility: CLINIC | Age: 39
End: 2024-07-31

## 2024-07-31 ENCOUNTER — LAB OUTSIDE AN ENCOUNTER (OUTPATIENT)
Dept: URBAN - METROPOLITAN AREA CLINIC 115 | Facility: CLINIC | Age: 39
End: 2024-07-31

## 2024-07-31 VITALS
HEART RATE: 70 BPM | HEIGHT: 64 IN | SYSTOLIC BLOOD PRESSURE: 100 MMHG | DIASTOLIC BLOOD PRESSURE: 64 MMHG | WEIGHT: 170 LBS | TEMPERATURE: 96.9 F | BODY MASS INDEX: 29.02 KG/M2

## 2024-07-31 PROBLEM — 278860009: Status: ACTIVE | Noted: 2024-07-31

## 2024-07-31 PROBLEM — 82423001: Status: ACTIVE | Noted: 2024-07-31

## 2024-07-31 RX ORDER — NORETHINDRONE 0.35 MG/1
1 TABLET TABLET ORAL ONCE A DAY
Status: ACTIVE | COMMUNITY

## 2024-07-31 RX ORDER — DICYCLOMINE HYDROCHLORIDE 10 MG/1
TAKE ONE CAPSULE BY MOUTH THREE TIMES DAILY AS NEEDED FOR ABDOMINAL CRAMPING PAIN CAPSULE ORAL
Qty: 180 TABLET | Refills: 3 | Status: ACTIVE | COMMUNITY

## 2024-07-31 RX ORDER — FLUTICASONE FUROATE 200 UG/1
1 PUFF POWDER RESPIRATORY (INHALATION) ONCE A DAY
Status: ACTIVE | COMMUNITY

## 2024-07-31 RX ORDER — PANTOPRAZOLE SODIUM 40 MG/1
TAKE 1 TABLET BY MOUTH DAILY TABLET, DELAYED RELEASE ORAL
Qty: 90 TABLET | Refills: 0 | Status: ACTIVE | COMMUNITY

## 2024-07-31 RX ORDER — SUCRALFATE 1 G/1
1 TABLET ON AN EMPTY STOMACH TABLET ORAL TWICE A DAY
Qty: 60 TABLET | Refills: 4 | Status: ACTIVE | COMMUNITY
End: 2024-09-01

## 2024-07-31 NOTE — HPI-TODAY'S VISIT:
Patient is complaining of having reflux that started about few weeks ago.  Patient complains of having severe epigastric burning as well as burning sensation into the throat along with some sore throat dry cough.  Patient feels feels like food is slow to go down however denies any obvious dysphagia.  She reports having some food allergies as and seasonal allergies are started taking Allegra.  Also denies any nausea or vomiting. Patient was seen last in January 2020 for diarrhea.  Stool studies were unremarkable.  She had a [facility]ac [facility] done [facility] that was unremarkable.  She went on low FODMAP diet and lost about 20 pounds her diarrhea has resolved.  She reports only some soft stools but no cramping lower abdominal pain.  Denies any bright red per rectum.  5/6/21:  Ms. Beltran was seen today for the follow-up.  She was last seen she had an upper endoscopy that revealed gastritis otherwise unremarkable.  She was complaining of having sleep disorder and insomnia for which she underwent sleep study was told to have negative for sleep apnea.  She was given Carafate to take it for dyspepsia.  Patient currently on pantoprazole once a day she still reports having epigastric fullness and distention after she eats food.  Patient stated eating food makes her symptoms better however also her symptoms get worse at random.  Patient denies any unintentional weight loss.  Bowel movements are regular.  Other complaints includes abdominal bloating and cramping for which dicyclomine helps.  11/2/21 Ms. Beltran is a 36-year-old female seen today for follow-up.  Patient stated she was diagnosed with asthma since July.  She is seeing pulmonologist.  She is also recommended to see a psychiatrist and psychologist because of her significant anxiety.  Reports having emergency room visits for having chest pressure sensation palpitations.  Patient reports taking pantoprazole once a day and she still feels like fullness as well as burning sensation into her throat.  Patient also reports lower abdominal cramping and discomfort.  Patient reports symptoms gets worse when she is stressed and anxious.  Other complaints includes epigastric fullness and less appetite and she after she eats she reports she has to sit up for 3 to 4 hours to feel better and can lie down.  Currently her anxiety is not under control and she is not on any medications.  Denies any melena bright red per rectum.  Prior procedure results have been reviewed with the patient.  3/18/22 Patient's was seen today for the follow-up.  Since she was last seen she reports having 5 pound unintentional weight loss.  Also complains of having feeling hungry and having indigestion after eating food.  She also reports having concern for bloating and epigastric fullness.  Is been taking PPI in the morning.  Admits for having increased stress and anxiety.  She reports epigastric abdominal discomfort not under control and it is burning in sensation nonradiating.  Slightly better with acid reducing medication however symptoms are not well controlled.  Admits to having esophageal spasm cough and chest tightness.  Patient was told to have a panic disorder.  Other complaints also includes abdominal bloating and discomfort denies any diarrhea or constipation.  1/25/24: Patient was seen today for complaints of lower abdominal pain and bloating and change in bowel habits.  She was last seen in March 2022.  She complains of right lower quadrant abdominal pain which is intermittent not sure if it was related to the ovulation with midcycle pain.  Patient reports having a complains of having some nausea associated with it.  Patient also reports having abdominal distention and fullness during this time and causes loss of appetite.  Patient other complaints also includes weight gain significant belching and burping as well as nausea associated with it not on any birth control pills she reports having fibroids and she had a recent ablation done she denies any heavy has cycles.  Patient is planning on getting pregnant.  She admits to taking ibuprofen on a frequent basis for recent bleeding.  And also lower abdominal pain and cramping.  Also reports having change in the consistency of the stools with the hard and soft stools alternating.  She has had an upper endoscopy in the past in 2020 that showed erosive esophagitis and hiatal hernia.  She had an unremarkable ultrasound of the abdomen in the past.  Patient denies any right upper quadrant pain.  Patient denies any family history of inflammatory bowel disease.   7/31/24;

## 2024-09-12 ENCOUNTER — LAB OUTSIDE AN ENCOUNTER (OUTPATIENT)
Dept: URBAN - METROPOLITAN AREA CLINIC 82 | Facility: CLINIC | Age: 39
End: 2024-09-12

## 2024-11-04 ENCOUNTER — ERX REFILL RESPONSE (OUTPATIENT)
Dept: URBAN - METROPOLITAN AREA CLINIC 82 | Facility: CLINIC | Age: 39
End: 2024-11-04

## 2024-11-04 RX ORDER — PANTOPRAZOLE SODIUM 40 MG/1
TAKE 1 TABLET BY MOUTH DAILY TABLET, DELAYED RELEASE ORAL
Qty: 90 TABLET | Refills: 0 | OUTPATIENT

## 2025-05-06 ENCOUNTER — TELEPHONE ENCOUNTER (OUTPATIENT)
Dept: URBAN - METROPOLITAN AREA CLINIC 85 | Facility: CLINIC | Age: 40
End: 2025-05-06

## 2025-05-06 RX ORDER — PANTOPRAZOLE SODIUM 40 MG/1
TAKE 1 TABLET BY MOUTH DAILY TABLET, DELAYED RELEASE ORAL
Qty: 90 TABLET | Refills: 0

## 2025-05-09 ENCOUNTER — WEB ENCOUNTER (OUTPATIENT)
Dept: URBAN - METROPOLITAN AREA CLINIC 82 | Facility: CLINIC | Age: 40
End: 2025-05-09

## 2025-05-09 RX ORDER — PANTOPRAZOLE SODIUM 40 MG/1
TAKE 1 TABLET BY MOUTH DAILY TABLET, DELAYED RELEASE ORAL
Qty: 90 TABLET | Refills: 0

## 2025-06-20 ENCOUNTER — WEB ENCOUNTER (OUTPATIENT)
Dept: URBAN - METROPOLITAN AREA CLINIC 82 | Facility: CLINIC | Age: 40
End: 2025-06-20

## 2025-06-20 RX ORDER — DICYCLOMINE HYDROCHLORIDE 10 MG/1
1 CAPSULE CAPSULE ORAL
Qty: 90 CAPSULE | Refills: 1

## 2025-06-25 ENCOUNTER — TELEPHONE ENCOUNTER (OUTPATIENT)
Dept: URBAN - METROPOLITAN AREA CLINIC 85 | Facility: CLINIC | Age: 40
End: 2025-06-25

## 2025-06-25 RX ORDER — DICYCLOMINE HYDROCHLORIDE 10 MG/1
1 CAPSULE CAPSULE ORAL
Qty: 90 CAPSULE | Refills: 1

## 2025-08-18 ENCOUNTER — WEB ENCOUNTER (OUTPATIENT)
Dept: URBAN - METROPOLITAN AREA CLINIC 82 | Facility: CLINIC | Age: 40
End: 2025-08-18

## 2025-08-18 RX ORDER — PANTOPRAZOLE SODIUM 40 MG/1
TAKE 1 TABLET BY MOUTH DAILY TABLET, DELAYED RELEASE ORAL
Qty: 90 TABLET | Refills: 0